# Patient Record
Sex: FEMALE | Race: WHITE | NOT HISPANIC OR LATINO | Employment: OTHER | URBAN - METROPOLITAN AREA
[De-identification: names, ages, dates, MRNs, and addresses within clinical notes are randomized per-mention and may not be internally consistent; named-entity substitution may affect disease eponyms.]

---

## 2019-03-08 ENCOUNTER — NEW PATIENT COMPREHENSIVE (OUTPATIENT)
Dept: URBAN - METROPOLITAN AREA CLINIC 43 | Facility: CLINIC | Age: 49
End: 2019-03-08

## 2019-03-08 DIAGNOSIS — H40.033: ICD-10-CM

## 2019-03-08 DIAGNOSIS — H04.123: ICD-10-CM

## 2019-03-08 DIAGNOSIS — H52.03: ICD-10-CM

## 2019-03-08 PROCEDURE — 92015 DETERMINE REFRACTIVE STATE: CPT

## 2019-03-08 PROCEDURE — 92002 INTRM OPH EXAM NEW PATIENT: CPT

## 2019-03-08 PROCEDURE — 92132 CPTRZD OPH DX IMG ANT SGM: CPT

## 2019-03-08 ASSESSMENT — VISUAL ACUITY
OS_SC: 20/40-2+1
OD_CC: J3
OS_PH: 20/30-2
OS_CC: J3
OD_SC: 20/50-2+1
OD_SC: J8
OS_SC: J8
OD_PH: 20/40-1+2

## 2019-03-08 ASSESSMENT — TONOMETRY
OS_IOP_MMHG: 10
OD_IOP_MMHG: 10

## 2019-03-14 ENCOUNTER — CONTACT LENS EXAM NEW (OUTPATIENT)
Dept: URBAN - METROPOLITAN AREA CLINIC 43 | Facility: CLINIC | Age: 49
End: 2019-03-14

## 2019-03-14 DIAGNOSIS — H52.03: ICD-10-CM

## 2019-03-14 DIAGNOSIS — Z97.3: ICD-10-CM

## 2019-03-14 PROCEDURE — 92310-1 LEVEL 1 CONTACT LENS MANAGEMENT

## 2019-04-01 ENCOUNTER — CONTACT LENS FOLLOW UP (OUTPATIENT)
Dept: URBAN - METROPOLITAN AREA CLINIC 43 | Facility: CLINIC | Age: 49
End: 2019-04-01

## 2019-04-01 DIAGNOSIS — Z97.3: ICD-10-CM

## 2019-04-01 PROCEDURE — 92310F

## 2019-04-08 ENCOUNTER — EST. PATIENT EMERGENCY (OUTPATIENT)
Dept: URBAN - METROPOLITAN AREA CLINIC 43 | Facility: CLINIC | Age: 49
End: 2019-04-08

## 2019-04-08 DIAGNOSIS — Z97.3: ICD-10-CM

## 2019-04-08 PROCEDURE — 92310F

## 2019-04-08 ASSESSMENT — VISUAL ACUITY
OS_SC: 20/30+2
OD_PH: 20/30
OD_SC: 20/50

## 2019-04-23 ENCOUNTER — GLAUCOMA EVAL (OUTPATIENT)
Dept: URBAN - METROPOLITAN AREA CLINIC 43 | Facility: CLINIC | Age: 49
End: 2019-04-23

## 2019-04-23 DIAGNOSIS — H40.033: ICD-10-CM

## 2019-04-23 DIAGNOSIS — H04.123: ICD-10-CM

## 2019-04-23 DIAGNOSIS — H40.023: ICD-10-CM

## 2019-04-23 PROCEDURE — 92014 COMPRE OPH EXAM EST PT 1/>: CPT

## 2019-04-23 PROCEDURE — 76514 ECHO EXAM OF EYE THICKNESS: CPT

## 2019-04-23 PROCEDURE — 92020 GONIOSCOPY: CPT

## 2019-04-23 RX ORDER — PREDNISOLONE ACETATE 10 MG/ML: 1 SUSPENSION/ DROPS OPHTHALMIC

## 2019-04-23 ASSESSMENT — VISUAL ACUITY
OS_SC: 20/30
OD_PH: 20/30
OD_SC: 20/40

## 2019-04-23 ASSESSMENT — PACHYMETRY
OS_CT_UM: 567
OD_CT_UM: 579

## 2019-04-23 ASSESSMENT — TONOMETRY
OD_IOP_MMHG: 11
OS_IOP_MMHG: 13

## 2019-05-08 ENCOUNTER — SURGERY/PROCEDURE (OUTPATIENT)
Dept: URBAN - METROPOLITAN AREA SURGERY 14 | Facility: SURGERY | Age: 49
End: 2019-05-08

## 2019-05-08 ENCOUNTER — PRE-OP/H&P (OUTPATIENT)
Dept: URBAN - METROPOLITAN AREA SURGERY 14 | Facility: SURGERY | Age: 49
End: 2019-05-08

## 2019-05-08 DIAGNOSIS — H40.023: ICD-10-CM

## 2019-05-08 DIAGNOSIS — H40.031: ICD-10-CM

## 2019-05-08 DIAGNOSIS — H40.033: ICD-10-CM

## 2019-05-08 PROCEDURE — 99211T TECH SERVICE

## 2019-05-08 PROCEDURE — 66761 REVISION OF IRIS: CPT

## 2019-05-15 ENCOUNTER — PRE-OP/H&P (OUTPATIENT)
Dept: URBAN - METROPOLITAN AREA SURGERY 14 | Facility: SURGERY | Age: 49
End: 2019-05-15

## 2019-05-15 ENCOUNTER — SURGERY/PROCEDURE (OUTPATIENT)
Dept: URBAN - METROPOLITAN AREA SURGERY 14 | Facility: SURGERY | Age: 49
End: 2019-05-15

## 2019-05-15 DIAGNOSIS — H40.032: ICD-10-CM

## 2019-05-15 PROCEDURE — 66761 REVISION OF IRIS: CPT

## 2019-05-15 PROCEDURE — 99211T TECH SERVICE

## 2019-06-07 ENCOUNTER — EST. PATIENT EMERGENCY (OUTPATIENT)
Dept: URBAN - METROPOLITAN AREA CLINIC 43 | Facility: CLINIC | Age: 49
End: 2019-06-07

## 2019-06-07 DIAGNOSIS — Z98.890: ICD-10-CM

## 2019-06-07 DIAGNOSIS — H04.123: ICD-10-CM

## 2019-06-07 PROCEDURE — 99212 OFFICE O/P EST SF 10 MIN: CPT

## 2019-06-07 ASSESSMENT — VISUAL ACUITY
OS_SC: J12
OD_SC: J12
OS_SC: 20/50+2
OD_SC: 20/50+1

## 2019-06-07 ASSESSMENT — TONOMETRY
OS_IOP_MMHG: 14
OD_IOP_MMHG: 16

## 2019-06-11 ENCOUNTER — DILATED FUNDUS EXAM (OUTPATIENT)
Dept: URBAN - METROPOLITAN AREA CLINIC 43 | Facility: CLINIC | Age: 49
End: 2019-06-11

## 2019-06-11 DIAGNOSIS — H21.82: ICD-10-CM

## 2019-06-11 DIAGNOSIS — H40.033: ICD-10-CM

## 2019-06-11 PROCEDURE — 92132 CPTRZD OPH DX IMG ANT SGM: CPT

## 2019-06-11 PROCEDURE — 92012 INTRM OPH EXAM EST PATIENT: CPT

## 2019-06-11 PROCEDURE — 92020 GONIOSCOPY: CPT

## 2019-06-11 ASSESSMENT — TONOMETRY
OD_IOP_MMHG: 13
OS_IOP_MMHG: 14

## 2019-06-11 ASSESSMENT — VISUAL ACUITY
OS_SC: 20/30-2
OD_SC: 20/50+1

## 2019-06-28 ENCOUNTER — CONTACT LENS FOLLOW UP (OUTPATIENT)
Dept: URBAN - METROPOLITAN AREA CLINIC 43 | Facility: CLINIC | Age: 49
End: 2019-06-28

## 2019-06-28 DIAGNOSIS — Z97.3: ICD-10-CM

## 2019-06-28 PROCEDURE — 92310F

## 2020-05-28 NOTE — PATIENT DISCUSSION
CATARACT, OU - EQUALLY VISUALLY SIGNIFICANT . SCHEDULE SX OS (PATIENTS LAZIER EYE) THEN LATER IN OD IF VISUAL SYMPTOMS PERSIST.  GLS RX GIVEN TO FILL IF DESIRES IN THE EVENT PT DOES NOT PROCEED W/ SX.

## 2020-05-28 NOTE — PATIENT DISCUSSION
"Multifocal, Trifocal and Extended-depth-of-focus lens (EDOF) - It was explained that multifocal/trifocal and extended depth of focus lenses split light and this can be associated with a decrease in contrast sensitivity, depth of focus, a double image and ghosting which may or may not resolve over time. Multifocal/trifocal lenses are lighting dependent. In low or dim lighting, glasses may be needed for optimal near vision. It was explained that rings, halos, starbursts or spider webs around point sources of light (headlights, tail lights, street lights, neon signs, the moon, stars, etc.) will be present indefinitely after multifocal/trifocal/EDOF lens implantation (with the exception of the Viviity MF IOL) While the majority of patients do not find this limit's their night time activities to include driving, in rare instances, it can.  It was explained that these lenses are designed to satisfy a defined area of near vision, or ""sweet spot""

## 2020-05-28 NOTE — PATIENT DISCUSSION
Surgery Counseling: I have discussed the option of glasses versus cataract surgery versus following. It was explained that when the patients vision no longer meets their visual needs and a glasses prescription does not improve visual symptoms, the option of cataract surgery is a reasonable next step. It was explained that there is no guarantee that removing the cataract will improve their visual symptoms, however; it is believed that the cataract is contributing to the patient's visual impairment and surgery may improve both the visual and functional status of the patient. The risks, benefits and alternatives of surgery were discussed with the patient. After this discussion, the patient desires to proceed with cataract surgery with implantation of an intraocular lens to improve vision and reduce glare during the day and night.

## 2020-06-11 NOTE — PATIENT DISCUSSION
***This patient had traditional cataract surgery performed. A monofocalIOL was placed to achieve a target refraction of plano (which should provide them with satisfactory distance vision with the aid of glasses/contact lenses). ***

## 2020-06-16 ENCOUNTER — ESTABLISHED COMPREHENSIVE EXAM (OUTPATIENT)
Dept: URBAN - METROPOLITAN AREA CLINIC 43 | Facility: CLINIC | Age: 50
End: 2020-06-16

## 2020-06-16 DIAGNOSIS — H40.023: ICD-10-CM

## 2020-06-16 DIAGNOSIS — H40.033: ICD-10-CM

## 2020-06-16 PROCEDURE — 92020 GONIOSCOPY: CPT

## 2020-06-16 PROCEDURE — 92014 COMPRE OPH EXAM EST PT 1/>: CPT

## 2020-06-16 PROCEDURE — 92133 CPTRZD OPH DX IMG PST SGM ON: CPT

## 2020-06-16 ASSESSMENT — VISUAL ACUITY
OD_CC: J2
OD_CC: 20/25-2
OS_CC: 20/30+1
OS_SC: J12
OD_SC: J12
OS_SC: 20/40-1
OD_SC: 20/70-1
OS_CC: J3

## 2020-06-16 ASSESSMENT — TONOMETRY
OS_IOP_MMHG: 16
OD_IOP_MMHG: 16

## 2021-05-04 ENCOUNTER — ESTABLISHED COMPREHENSIVE EXAM (OUTPATIENT)
Dept: URBAN - METROPOLITAN AREA CLINIC 43 | Facility: CLINIC | Age: 51
End: 2021-05-04

## 2021-05-04 DIAGNOSIS — H52.03: ICD-10-CM

## 2021-05-04 DIAGNOSIS — Z97.3: ICD-10-CM

## 2021-05-04 PROCEDURE — 92015 DETERMINE REFRACTIVE STATE: CPT

## 2021-05-04 PROCEDURE — 92012 INTRM OPH EXAM EST PATIENT: CPT

## 2021-05-04 PROCEDURE — 92310-1 LEVEL 1 CONTACT LENS MANAGEMENT

## 2021-05-04 ASSESSMENT — VISUAL ACUITY
OD_SC: J12
OS_SC: J12
OS_SC: 20/50
OD_SC: 20/60+1

## 2021-05-04 ASSESSMENT — TONOMETRY
OD_IOP_MMHG: 12
OS_IOP_MMHG: 13

## 2022-05-05 ENCOUNTER — COMPREHENSIVE EXAM (OUTPATIENT)
Dept: URBAN - METROPOLITAN AREA CLINIC 43 | Facility: CLINIC | Age: 52
End: 2022-05-05

## 2022-05-05 DIAGNOSIS — Z97.3: ICD-10-CM

## 2022-05-05 DIAGNOSIS — H52.03: ICD-10-CM

## 2022-05-05 PROCEDURE — 92015 DETERMINE REFRACTIVE STATE: CPT

## 2022-05-05 PROCEDURE — 92012 INTRM OPH EXAM EST PATIENT: CPT

## 2022-05-05 PROCEDURE — 92310-1 LEVEL 1 CONTACT LENS MANAGEMENT

## 2022-05-05 ASSESSMENT — VISUAL ACUITY
OD_SC: 20/70-2
OS_SC: 20/60-2
OD_SC: J12
OS_SC: J12
OU_SC: 20/50

## 2022-05-05 ASSESSMENT — TONOMETRY
OS_IOP_MMHG: 16
OD_IOP_MMHG: 16

## 2024-04-09 ENCOUNTER — COMPREHENSIVE EXAM (OUTPATIENT)
Dept: URBAN - METROPOLITAN AREA CLINIC 43 | Facility: CLINIC | Age: 54
End: 2024-04-09

## 2024-04-09 DIAGNOSIS — H52.03: ICD-10-CM

## 2024-04-09 DIAGNOSIS — Z97.3: ICD-10-CM

## 2024-04-09 PROCEDURE — 92015 DETERMINE REFRACTIVE STATE: CPT

## 2024-04-09 PROCEDURE — 92012 INTRM OPH EXAM EST PATIENT: CPT

## 2024-04-09 PROCEDURE — 92310-2 LEVEL 2 CONTACT LENS MANAGEMENT

## 2024-04-09 ASSESSMENT — TONOMETRY
OD_IOP_MMHG: 10
OS_IOP_MMHG: 11

## 2024-04-09 ASSESSMENT — VISUAL ACUITY
OS_SC: J12
OS_SC: 20/60
OD_SC: >J12
OD_SC: 20/80